# Patient Record
Sex: FEMALE | Race: WHITE | URBAN - METROPOLITAN AREA
[De-identification: names, ages, dates, MRNs, and addresses within clinical notes are randomized per-mention and may not be internally consistent; named-entity substitution may affect disease eponyms.]

---

## 2017-03-29 ENCOUNTER — EMPLOYEE WELLNESS (OUTPATIENT)
Dept: OTHER | Age: 57
End: 2017-03-29

## 2017-03-29 LAB
CHOLESTEROL, TOTAL: 240 MG/DL (ref 0–199)
GLUCOSE BLD-MCNC: 100 MG/DL (ref 70–99)
HDLC SERPL-MCNC: 59 MG/DL (ref 40–60)
LDL CHOLESTEROL CALCULATED: 163 MG/DL
TRIGL SERPL-MCNC: 90 MG/DL (ref 0–150)

## 2018-03-20 VITALS — WEIGHT: 205 LBS

## 2020-12-01 ENCOUNTER — PROCEDURE VISIT (OUTPATIENT)
Dept: AUDIOLOGY | Age: 60
End: 2020-12-01
Payer: COMMERCIAL

## 2020-12-01 ENCOUNTER — OFFICE VISIT (OUTPATIENT)
Dept: ENT CLINIC | Age: 60
End: 2020-12-01
Payer: COMMERCIAL

## 2020-12-01 VITALS
HEIGHT: 64 IN | DIASTOLIC BLOOD PRESSURE: 73 MMHG | BODY MASS INDEX: 36.54 KG/M2 | SYSTOLIC BLOOD PRESSURE: 109 MMHG | WEIGHT: 214 LBS | HEART RATE: 84 BPM | TEMPERATURE: 97.6 F

## 2020-12-01 PROCEDURE — 92557 COMPREHENSIVE HEARING TEST: CPT | Performed by: AUDIOLOGIST

## 2020-12-01 PROCEDURE — 92567 TYMPANOMETRY: CPT | Performed by: AUDIOLOGIST

## 2020-12-01 PROCEDURE — 99243 OFF/OP CNSLTJ NEW/EST LOW 30: CPT | Performed by: STUDENT IN AN ORGANIZED HEALTH CARE EDUCATION/TRAINING PROGRAM

## 2020-12-01 NOTE — PROGRESS NOTES
Sukhwinder Madrid   1960, 63 y.o. female   <[id number]>       Referring Provider: Naz Chapman MD  Referral Type: In an order in [address] [place] [address]    Reason for Visit: Evaluation of suspected change in hearing, tinnitus, or balance. ADULT AUDIOLOGIC EVALUATION      Sukhwinder Madrid is a 63 y.o. female seen today, 12/1/2020 , for an initial audiologic evaluation. Patient was seen by Naz Chapman MD following today's evaluation. AUDIOLOGIC AND OTHER PERTINENT MEDICAL HISTORY:      Sukhwinder Madrid noted tinnitus and family history of hearing loss. Patient reports a gradual decrease in hearing AU. This began several years ago but she feels it has progressed in the last 5 years. She has been wearing Resound DOT RICs for 8-9 years. Patient reports she is still having difficulty hearing speech with the hearing aids. She notes intermittent tinnitus bilaterally. Patient also reports her mother and 3 of her sisters have a history of hearing loss. Sukhwinder Madrid denied otalgia, aural fullness, otorrhea, dizziness, imbalance, history of falls, history of significant noise exposure, history of head trauma and history of ear surgery. Date: 12/1/2020     IMPRESSIONS:      AU: Mild to Moderate SNHL, Excellent WRS, Type A tymps    Hearing loss consistent with SNHL. Hearing loss significant enough to create hearing difficulty in most listening situations. Discussed hearing loss, tinnitus, and hearing aids with patient. Patient is welcome to follow up with her dispensing audiologist or to return to [address] [place] [address] for a HAE to discuss new technology. Patient to follow medical recommendations per Naz Chapman MD .    ASSESSMENT AND FINDINGS:     Otoscopy revealed: Clear ear canals bilaterally    RIGHT EAR:  Hearing Sensitivity: Mild to Moderate Sensorineural hearing loss. Speech Recognition Threshold: 55 dB HL  Word Recognition: Excellent (%), based on NU-6 25-word list at 85m dBHL using recorded speech stimuli.     Tympanometry: Normal peak pressure and compliance, Type A tympanogram, consistent with normal middle ear function. Acoustic Reflexes: Ipsilateral: Did not test. Contralateral: Did not test.    LEFT EAR:  Hearing Sensitivity: Mild to Moderate Sensorineural hearing loss. Speech Recognition Threshold: 55 dB HL  Word Recognition: Excellent (%), based on NU-6 25-word list at 85m dBHL using recorded speech stimuli. Tympanometry: Normal peak pressure and compliance, Type A tympanogram, consistent with normal middle ear function. Acoustic Reflexes: Ipsilateral: Did not test. Contralateral: Did not test.    Reliability: Good  Transducer: Inserts    See scanned audiogram dated 12/1/2020  for results. PATIENT EDUCATION:       The following items were discussed with the patient:    - Good Communication Strategies    - Hearing Loss and Hearing Aids   - Tinnitus Management Strategies    Educational information was shared in the After Visit Summary. RECOMMENDATIONS:                                                                                                                                                                                                                                                            The following items are recommended based on patient report and results from today's appointment:   - Continue medical follow-up with Leonila Ventura MD.   - Retest hearing as medically indicated and/or sooner if a change in hearing is noted. - If desired, schedule a Hearing Aid Evaluation (HAE) appointment to discuss hearing aid options    - Continue hearing aid use and follow-up with dispensing audiologist as needed   - Utilize \"Good Communication Strategies\" as discussed to assist in speech understanding with communication partners. - Maintain a sound enriched environment to assist in the management of tinnitus symptoms.          Wilfrid Cox  Audiologist    Chart CC'd to: Leonila Ventura MD      Degree of   Hearing Sensitivity dB Range   Within Normal Limits (WNL) 0 - 20   Mild 20 - 40   Moderate 40 - 55   Moderately-Severe 55 - 70   Severe 70 - 90   Profound 90 +

## 2020-12-01 NOTE — PROGRESS NOTES
[facility]      Patient Name: Marycarmen Cardoso  Medical Record Number:  <[id number]>  Primary Care Physician:  Lorelei Zaidi  Date of Consultation: 12/1/2020    Chief Complaint:   Chief Complaint   Patient presents with    Hearing Problem        HISTORY OF PRESENT ILLNESS  Raquel is a(n) 63 y.o. female who presents today for evaluation of hearing loss. She had an audiogram which I independently reviewed today. Shows evidence of bilateral mild sloping to moderate sensorineural hearing loss which is symmetric. She has type a tympanograms bilaterally. 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 Her job requires her to be able to talk on the phone, listen to multiple telemetry signals and interact with staff in a noisy environment. She currently wears hearing aids. These are approximately 6years old. She has now finding that she has significant difficulties performing her duties at work due to her hearing loss. She often asked people to repeat themselves multiple times. She is having trouble differentiating certain telemetry unit signals. I independently reviewed the patients past medical history, past surgical history, and social history. They are unremarkable except as noted in the HPI and below. The patient denies any family history related to the current complaint, and they deny any family history of bleeding disorders or difficulties with anesthesia unless noted below. There is no problem list on file for this patient. No past surgical history on file. No family history on file.   Social History     Tobacco Use    Smoking status: Not on file   Substance Use Topics    Alcohol use: Not on file    Drug use: Not on file        Orders Only on 03/29/2017   Component Date Value Ref Range Status    Glucose 03/29/2017 100* 70 - 99 mg/dL Final    Cholesterol, Total 03/29/2017 240* 0 - 199 mg/dL Final    Triglycerides 03/29/2017 90  0 - 150 mg/dL Final    HDL 03/29/2017 59  40 - 60 mg/dL Final    LDL Calculated 03/29/2017 163* <100 mg/dL Final        DRUG/FOOD ALLERGIES: Patient has no known allergies. CURRENT MEDICATIONS  Prior to Admission medications    Not on File       REVIEW OF SYSTEMS  The following systems were reviewed and revealed the following in addition to any already discussed in the HPI:    CONSTITUTIONAL: no weight loss, no fever, no night sweats, no chills  EYES: no vision changes, no blurry vision  EARS: no changes in hearing, no otalgia  NOSE: no epistaxis, no rhinorrhea  RESPIRATORY: no  Difficulty breathing, no shortness of breath  CV: no chest pain, no Peripheral vascular disease  HEME: No coagulation disorder, no Bleeding disorder  NEURO: no TIA or stroke-like symptoms  SKIN: No new rashes in the head and neck, no recent skin cancers  MOUTH: No new ulcers, no recent teeth infections  GASTROINTESTINAL: No diarrhea, stomach pain  PSYCH: No anxiety, no depression      PHYSICAL EXAM  /73 (Site: Left Upper Arm, Position: Sitting, Cuff Size: Medium Adult)   Pulse 84   Temp 97.6 °F (36.4 °C) (Temporal)   Ht 5' 4\" (1.626 m)   Wt 214 lb (97.1 kg)   BMI 36.73 kg/m²     GENERAL: No acute distress, alert and oriented, no hoarseness, strong voice  EYES: EOMI, Anti-icteric  HENT:   Head: Normocephalic and atraumatic.    Face:  Symmetric, facial nerve intact, no sinus tenderness  Right Ear: Normal external ear, normal external auditory canal, intact tympanic membrane with normal mobility and aerated middle ear  Left Ear: Normal external ear, normal external auditory canal, intact tympanic membrane with normal mobility and aerated middle ear  Mouth/Oral Cavity:  normal lips, Uvula is midline, no mucosal lesions, no trismus, normal dentition, normal salivary quality/flow  Oropharynx/Larynx:  normal oropharynx, normal tonsils; patient did not tolerate mirror exam due to excessive gag reflex  Nose:Normal external nasal appearance. Anterior rhinoscopy shows a normal septum. Normal turbinates. Normal mucosa   NECK: Normal range of motion, no thyromegaly, trachea is midline, no lymphadenopathy, no neck masses, no crepitus  CHEST: Normal respiratory effort, no retractions, breathing comfortably  SKIN: No rashes, normal appearing skin, no evidence of skin lesions/tumors  Neuro:  cranial nerve II-XII intact; normal gait  Cardio:  no edema    Patient wears bilateral hearing aids. PROCEDURE      ASSESSMENT/PLAN  1. Sensorineural hearing loss (SNHL) of both ears      2. Tinnitus of both ears      This very pleasant 76-year-old female here today for evaluation of issues related to her ears. I went over her audiogram with her and explained to her that her difficulties hearing in her work environment are directly related to her hearing loss. Despite her wearing hearing aids, I suspect that the fact that there 6years old they are a bit outdated as well as they are probably under powered for her hearing loss at this point. I discussed with her that the best plan of action going forward be discussed with her supervisor, managers and human resources regarding what type of accommodations can be made for her she has now finding it difficult to perform her normal functions in her job. Regarding her hearing loss, she certainly is a good candidate for hearing aid upgrade. She will check with her insurance to see if anything is covered, otherwise she will have to wait until she is able to afford them. I provided her with instructions to contact me if I can be of any further assistance going forward. This note was generated completely or in part utilizing Dragon dictation speech recognition software. Occasionally, words are mistranscribed and despite editing, the text may contain inaccuracies due to incorrect word recognition.   If further clarification is needed please contact the office at [phone])

## 2020-12-01 NOTE — PATIENT INSTRUCTIONS
Good Communication Strategies    Communication can be challenging for anyone, but can be especially difficult for those with some degree of hearing loss. While we may not be able to control every factor that may lead to difficulty with communication, there are Good Communication Strategies that we can all use in our day-to-day lives. Communication takes both parties working together for it to be successful. Tips as a Listener:   1. Control your environment. It is important to limit the amount of background noise in the room when possible. You should also consider having a good light source in the room to best see the other person. 2. Ask for clarification. Instead of saying \"What?\", you can use parts of what you heard to make a new question. For example, if you heard the word \"Thursday\" but not the rest of the week, you may ask \"What was that about Thursday? \" or \"What did you want to do Thursday? \". This shows the person talking that you are listening and will help them better explain what they are saying. 3. Be an advocate for yourself. If you are hearing but not understanding, tell the other person \"I can hear you, but I need you to slow down when you speak. \"  Or if someone is facing the other direction, say \"I cannot hear you when you are not looking at me when we talk. \"       Tips as a Talker:   - Sit or stand 3 to 6 feet away to maximize audibility         -- It is unrealistic to believe someone else will fully hear your message if you are speaking from across the room or in a different room in the house   - Stay at eye level to help with visual cues   - Make sure you have the persons attention before speaking   - Use facial expressions and gestures to accentuate your message   - Raise your voice slightly (do not scream)   - Speak slowly and distinctly   - Use short, simple sentences   - Rephrase your words if the person is having a hard time understanding you    - To avoid distortion, dont speak directly into a persons ear      Some additional items that may be helpful:   - Remain patient - this is important for both parties   - Write down items that still cannot be heard/understood. You may write with pen/paper or consider typing/texting on a cell phone or smart device. - If background noise is unavoidable, try to keep yourself in a good position in the room. By sitting at a shea on the side of the restaurant (preferably a corner), it will be easier to communicate than if you were sitting at a table in the middle with background noise surrounding you. Keep yourself positioned away from music speakers or heavy foot traffic. Hearing Loss: Care Instructions  Your Care Instructions      Hearing loss is a sudden or slow decrease in how well you hear. It can range from mild to profound. Permanent hearing loss can occur with aging, and it can happen when you are exposed long-term to loud noise. Examples include listening to loud music, riding motorcycles, or being around other loud machines. Hearing loss can affect your work and home life. It can make you feel lonely or depressed. You may feel that you have lost your independence. But hearing aids and other devices can help you hear better and feel connected to others. Follow-up care is a key part of your treatment and safety. Be sure to make and go to all appointments, and call your doctor if you are having problems. It's also a good idea to know your test results and keep a list of the medicines you take. How can you care for yourself at home? · Avoid loud noises whenever possible. This helps keep your hearing from getting worse. Always wear hearing protection around loud noises. · If appropriate, wear hearing aid(s) as directed. It is recommended that hearing aids are worn during all waking hours to keep your brain active and give it access to the sounds it is missing.       · If you are beginning your process with hearing aid(s), schedule a \"Hearing Aid Evaluation\" with an audiologist to discuss your lifestyle, features of hearing aid technology, and styles of hearing aids available. It is recommended that you contact your insurance company to determine if you have a hearing aid benefit, as this may dictate who you can see for these services. · Have hearing tests as your doctor suggests. They can show whether your hearing has changed. Your hearing aid may need to be adjusted. · Use other assistive devices as needed. These may include:  ? Telephone amplifiers and hearing aids that can connect to a television, stereo, radio, or microphone. ? Devices that use lights or vibrations. These alert you to the doorbell, a ringing telephone, or a baby monitor. ? Television closed-captioning. This shows the words at the bottom of the screen. Most new TVs can do this. ? TTY (text telephone). This lets you type messages back and forth on the telephone instead of talking or listening. These devices are also called TDD. When messages are typed on the keyboard, they are sent over the phone line to a receiving TTY. The message is shown on a monitor. · Use pagers, fax machines, text, and email if it is hard for you to communicate by telephone. · Try to learn a listening technique called speech-reading. It is not lip-reading. You pay attention to people's gestures, expressions, posture, and tone of voice. These clues can help you understand what a person is saying. Face the person you are talking to, and have him or her face you. Make sure the lighting is good. You need to see the other person's face clearly. · Think about counseling if you need help to adjust to your hearing loss. When should you call for help? Watch closely for changes in your health, and be sure to contact your doctor if:    · You think your hearing is getting worse. · You have new symptoms, such as dizziness or nausea.            Tinnitus: Overview and Management Strategies          Many people have some ringing sounds in their ears once in a while. You may hear a roar, a hiss, a tinkle, or a buzz. The sound usually lasts only a few minutes. If it goes on all the time, you may have tinnitus. Tinnitus is usually caused by long-term exposure to loud noise. This damages the nerves in the inner ear. It can occur with all types of hearing loss. It may be a symptom of almost any ear problem. Tinnitus may be caused by a buildup of earwax. Or, it may be caused by ear infections or certain medicines (especially antibiotics or large amounts of aspirin). You can also hear noises in your ears because of an injury to the ears, drinking too much alcohol or caffeine, or a medical condition. Other conditions may also contribute to tinnitus, including: head and neck trauma, temporomandibular joint disorder (TMJ), sinus pressure and barometric trauma, traumatic brain injury, metabolic disorders, autoimmune disorders, stress, and high blood pressure. You may need tests to evaluate your hearing and to find causes of long-lasting tinnitus. Your doctor may suggest one or more treatments to help you cope with the tinnitus. You can also do things at home to help reduce symptoms. Causes of Tinnitus  We do not know the exact cause of tinnitus. One thing we do know is that you are not imagining it. If you have tinnitus, chances are the cause will remain a mystery. Conditions that might cause tinnitus include the following:    Hearing loss    Ménière's disease    Loud noise exposure    Migraines    Head injury    Drugs or medicines that are toxic to hearing    Anemia    High blood pressure    Stress    A lot of wax in the ear    Certain types of tumors    Having a lot of caffeine    Smoking cigarettes  You may find that your tinnitus is worse at night. This happens because it is quiet and you are not distracted.  Feeling tired and stressed may make your tinnitus worse.    Follow-up care is a key part of your treatment and safety. Be sure to make and go to all appointments, and call your doctor if you are having problems. It's also a good idea to know your test results and keep a list of the medicines you take. How can you care for yourself at home? · Limit or cut out alcohol, caffeine, and sodium. They can make your symptoms worse. · Do not smoke or use other tobacco products. Nicotine reduces blood flow to the ear and makes tinnitus worse. If you need help quitting, talk to your doctor about stop-smoking programs and medicines. These can increase your chances of quitting for good. · Talk to your doctor about whether to stop taking aspirin and similar products such as ibuprofen or naproxen. · Get exercise often. It can help improve blood flow to the ear. Hearing Aids and Other Devices  A hearing aid may help your tinnitus if you have a hearing loss. An audiologist can help you find and use the best hearing aid for you. Tinnitus maskers look like hearing aids. They make a sound that masks, or covers up, the tinnitus. The masking sound distracts you from the ringing in your ears. You may be able to use a masker and a hearing aid at the same time. Sound machines can be useful at night or during quiet times. There are machines you can buy at the store. Or, you can find apps on your phone that make sounds, like the ocean or rainfall. Fish tanks, fans, quiet music, and indoor waterfalls can help, as well. Ways to manage/cope with tinnitus  Some tinnitus may last a long time. To manage your tinnitus, try to:  · Avoid noises that you think caused your tinnitus. If you can't avoid loud noises, wear earplugs or earmuffs. · Ignore the sound by paying attention to other things. Keeping your brain busy with other tasks or background noise can help your brain not focus on the tinnitus. · Try to not give the tinnitus an emotional reaction.   Do your best to ignore the sound and not let it bother you. Relax using biofeedback, meditation, or yoga. Feeling stressed and being tired can make tinnitus worse. · Play music or white noise to help you sleep. Background noise may cover up the noise that you hear in your ears. You can buy a tabletop machine or a device that sits under your pillow to play soothing sounds, like ocean waves. · Smart phones have free apps, such as Whist, Relax Melodies, ReSound Relief, and White Noise Lite. These apps have different types of sounds/noise, some of which you can blend together to find sounds that are most soothing to you. · Hearing aid technology, especially when there is some hearing loss, may help reduce tinnitus symptoms by giving your brain better access to the sounds it is missing. There are some hearing aids with built-in noise generator programs, which may help when amplification alone is not enough. Additional resources may be found through the [employer]inn[employer] at www.alex.org    When should you call for help? Call 911 anytime you think you may need emergency care. For example, call if:    · You have symptoms of a stroke. These may include:  ? Sudden numbness, tingling, weakness, or loss of movement in your face, arm, or leg, especially on only one side of your body. ? Sudden vision changes. ? Sudden trouble speaking. ? Sudden confusion or trouble understanding simple statements. ? Sudden problems with walking or balance. ? A sudden, severe headache that is different from past headaches. Call your doctor now or seek immediate medical care if:    · You develop other symptoms. These may include hearing loss (or worse hearing loss), balance problems, dizziness, nausea, or vomiting. Watch closely for changes in your health, and be sure to contact your doctor if:    · Your tinnitus moves from both ears to one ear. · Your hearing loss gets worse within 1 day after an ear injury.      · Your tinnitus or earwax and fluid draining from the ear. Their small size may be hard for some people to handle. They are not made for children. You have some options if you have a hearing problem and are thinking about getting hearing aids. You can go to your doctor or an audiologist. He or she will do a hearing test and help you decide which type and style of hearing aid may be best for you. What else should I know about hearing aids? Find out if your insurance covers hearing aids. They can be expensive. Different types of hearing aids come with different costs. Also find out about a warranty or return policy in case you are not happy with your hearing aids. Follow-up care is a key part of your treatment and safety. Be sure to make and go to all appointments, and call your doctor if you are having problems. It's also a good idea to know your test results and keep a list of the medicines you take. Where can you learn more? Go to https://[employer]. org and sign in to your [employer] account. Enter Q023 in the WOMN box to learn more about \"Learning About Hearing Aids. \"     If you do not have an account, please click on the \"Sign Up Now\" link. Current as of: October 21, 2018  Content Version: 12.1  © 2621-2942 [employer]. Care instructions adapted under license by [facility] ([facility]). If you have questions about a medical condition or this instruction, always ask your healthcare professional. [place] [address] any warranty or liability for your use of this information.

## 2023-02-17 ENCOUNTER — OFFICE VISIT (OUTPATIENT)
Dept: PULMONOLOGY | Age: 63
End: 2023-02-17

## 2023-02-17 VITALS
DIASTOLIC BLOOD PRESSURE: 86 MMHG | HEART RATE: 87 BPM | RESPIRATION RATE: 18 BRPM | SYSTOLIC BLOOD PRESSURE: 126 MMHG | HEIGHT: 66 IN | OXYGEN SATURATION: 97 % | BODY MASS INDEX: 37.12 KG/M2 | WEIGHT: 231 LBS | TEMPERATURE: 97.1 F

## 2023-02-17 DIAGNOSIS — G47.10 HYPERSOMNIA: ICD-10-CM

## 2023-02-17 DIAGNOSIS — Z87.891 PERSONAL HISTORY OF TOBACCO USE: Primary | ICD-10-CM

## 2023-02-17 DIAGNOSIS — J44.9 COPD, MODERATE (HCC): ICD-10-CM

## 2023-02-17 DIAGNOSIS — J30.89 OTHER ALLERGIC RHINITIS: ICD-10-CM

## 2023-02-17 DIAGNOSIS — E66.9 OBESITY (BMI 30-39.9): ICD-10-CM

## 2023-02-17 RX ORDER — FLUTICASONE PROPIONATE 50 MCG
1 SPRAY, SUSPENSION (ML) NASAL DAILY
COMMUNITY

## 2023-02-17 RX ORDER — CITALOPRAM 40 MG/1
40 TABLET ORAL DAILY
COMMUNITY

## 2023-02-17 RX ORDER — ATORVASTATIN CALCIUM 20 MG/1
20 TABLET, FILM COATED ORAL DAILY
COMMUNITY

## 2023-02-17 RX ORDER — ASPIRIN 81 MG/1
81 TABLET ORAL DAILY
COMMUNITY

## 2023-02-17 RX ORDER — CETIRIZINE HYDROCHLORIDE 10 MG/1
10 TABLET ORAL DAILY
COMMUNITY

## 2023-02-17 RX ORDER — VALSARTAN 40 MG/1
40 TABLET ORAL DAILY
COMMUNITY

## 2023-02-17 RX ORDER — BUPROPION HYDROCHLORIDE 150 MG/1
150 TABLET ORAL EVERY MORNING
COMMUNITY

## 2023-02-17 RX ORDER — FLUTICASONE PROPIONATE AND SALMETEROL 100; 50 UG/1; UG/1
1 POWDER RESPIRATORY (INHALATION) EVERY 12 HOURS
COMMUNITY

## 2023-02-17 ASSESSMENT — SLEEP AND FATIGUE QUESTIONNAIRES
HOW LIKELY ARE YOU TO NOD OFF OR FALL ASLEEP WHEN YOU ARE A PASSENGER IN A CAR FOR AN HOUR WITHOUT A BREAK: 2
HOW LIKELY ARE YOU TO NOD OFF OR FALL ASLEEP WHILE SITTING AND READING: 2
ESS TOTAL SCORE: 13
HOW LIKELY ARE YOU TO NOD OFF OR FALL ASLEEP IN A CAR, WHILE STOPPED FOR A FEW MINUTES IN TRAFFIC: 0
HOW LIKELY ARE YOU TO NOD OFF OR FALL ASLEEP WHILE SITTING AND TALKING TO SOMEONE: 0
HOW LIKELY ARE YOU TO NOD OFF OR FALL ASLEEP WHILE WATCHING TV: 1
HOW LIKELY ARE YOU TO NOD OFF OR FALL ASLEEP WHILE LYING DOWN TO REST IN THE AFTERNOON WHEN CIRCUMSTANCES PERMIT: 3
HOW LIKELY ARE YOU TO NOD OFF OR FALL ASLEEP WHILE SITTING QUIETLY AFTER LUNCH WITHOUT ALCOHOL: 2
NECK CIRCUMFERENCE (INCHES): 20
HOW LIKELY ARE YOU TO NOD OFF OR FALL ASLEEP WHILE SITTING INACTIVE IN A PUBLIC PLACE: 3

## 2023-02-17 ASSESSMENT — COPD QUESTIONNAIRES
QUESTION2_CHESTPHLEGM: 0
QUESTION5_HOMEACTIVITIES: 3
QUESTION1_COUGHFREQUENCY: 0
QUESTION8_ENERGYLEVEL: 5
QUESTION6_LEAVINGHOUSE: 0
QUESTION7_SLEEPQUALITY: 3
CAT_TOTALSCORE: 16
QUESTION4_WALKINCLINE: 5
QUESTION3_CHESTTIGHTNESS: 0

## 2023-02-17 NOTE — ASSESSMENT & PLAN NOTE
Trelegy working well but complains of cost.    Generics are cheaper but only two generic medications at this point, Symbicort and Advair. This was discussed.       Will try coupon for Trelegy and monitor cost.

## 2023-02-17 NOTE — PROGRESS NOTES
REASON FOR CONSULTATION/CC: COPD   Chief Complaint   Patient presents with    [facility]    COPD        Consult at request of   [place] [id number], [address] for COPD    PCP: Rosalia CLINE    HISTORY OF PRESENT ILLNESS: Ely Paula is a 60y.o. year old female with a history of      She state she had COPD > 7 ago with PFT    COPD  Was changed for Advair to Trelegy . Has albuterol HFA but not clear it is working   Trelegy is working well. complains of cost of ~ $40 per month. allergic rhinitis  Zyrtec. Obesity       Tobacco   Quit smoking 6 months ago    Wellbutrin         Desaturation at night per iwatch  Snoring   hypersomnia           REVIEW OF SYSTEMS:  Constitutional: Negative for fever    HENT: Negative for sore throat  Eyes: Negative for redness   Respiratory: Negative for dyspnea, cough  Cardiovascular: Negative for chest pain  Gastrointestinal: Negative for vomiting, diarrhea   Genitourinary: Negative for hematuria   Musculoskeletal: Negative for arthralgias   Skin: Negative for rash  Neurological: Negative for syncope  Hematological: Negative for adenopathy  Psychiatric/Behavorial: Negative for anxiety      SOCIAL HISTORY:   reports that she quit smoking about 5 months ago. Her smoking use included cigarettes. She started smoking about 51 years ago. She smoked an average of .25 packs per day. She has been exposed to tobacco smoke. She does not have any smokeless tobacco history on file. PAST MEDICAL HISTORY:  No past medical history on file. PAST SURGICAL HISTORY:  No past surgical history on file. FAMILY HISTORY:  family history includes Cancer in her mother; Diabetes in her mother. Objective:   PHYSICAL EXAM:  Blood pressure 126/86, pulse 87, temperature 97.1 °F (36.2 °C), resp. rate 18, height 5' 5.5\" (1.664 m), weight 231 lb (104.8 kg), SpO2 97 %.'  Gen: No distress. Eyes: PERRL. No sclera icterus. No conjunctival injection. ENT: No discharge. Pharynx clear. External appearance of ears and nose normal. Mallampati  4  Neck: Trachea midline. No obvious mass. Resp: No accessory muscle use. No crackles. No wheezes. No rhonchi. CV: Regular rate. Regular rhythm. No murmur or rub. No edema. GI:    Skin: Warm, dry, normal texture and turgor. No nodule on exposed extremities. Lymph: No cervical LAD. No supraclavicular LAD. M/S: No cyanosis. No clubbing. No joint deformity. Neuro: Moves all four extremities. Psych: Oriented x 3. No anxiety. Awake. Alert. Intact judgement and insight. Current Outpatient Medications   Medication Sig Dispense Refill    valsartan (DIOVAN) 40 MG tablet Take 40 mg by mouth daily      citalopram (CELEXA) 40 MG tablet Take 40 mg by mouth daily      aspirin 81 MG EC tablet Take 81 mg by mouth daily      buPROPion (WELLBUTRIN XL) 150 MG extended release tablet Take 150 mg by mouth every morning      atorvastatin (LIPITOR) 20 MG tablet Take 20 mg by mouth daily      fluticasone (FLONASE) 50 MCG/ACT nasal spray 1 spray by Each Nostril route daily      fluticasone-salmeterol (ADVAIR) 100-50 MCG/ACT AEPB diskus inhaler Inhale 1 puff into the lungs every 12 hours      cetirizine (ZYRTEC) 10 MG tablet Take 10 mg by mouth daily       No current facility-administered medications for this visit.        Data Reviewed:     Category 1 Data points:      Last CBC  Lab Results   Component Value Date/Time    WBC 5.7 04/01/2011 08:37 AM    RBC 4.77 04/01/2011 08:37 AM    HGB 14.5 04/01/2011 08:37 AM    MCV 91.6 04/01/2011 08:37 AM     04/01/2011 08:37 AM     Last Renal  Lab Results   Component Value Date/Time     04/01/2011 08:37 AM    K 4.6 04/01/2011 08:37 AM     04/01/2011 08:37 AM    CO2 23 04/01/2011 08:37 AM    BUN 12 04/01/2011 08:37 AM    CREATININE 0.6 04/01/2011 08:37 AM    GLUCOSE 100 03/29/2017 07:05 AM    CALCIUM 9.3 04/01/2011 08:37 AM       Last ABG  POC Blood Gas: No results found for: Karan Wilson, POCPO2, POCHCO3, NBEA, PJRA1LIL  No results for input(s): PH, PCO2, PO2, HCO3, BE, O2SAT in the last 72 hours. Assessment:          COPD, moderately severe 2017  Tobacco abuse, quit Aug 2022  Obesity Body mass index is 37.86 kg/m². Hypersomnia, Mallampati  4          Plan:      Problem List Items Addressed This Visit       Personal history of tobacco use - Primary     lung cancer screening ordered. Relevant Orders    VT VISIT TO DISCUSS LUNG CA SCREEN W LDCT (Completed)    CT Lung Screen (Annual)    Other allergic rhinitis     Zyrtec         Relevant Medications    fluticasone (FLONASE) 50 MCG/ACT nasal spray    fluticasone-salmeterol (ADVAIR) 100-50 MCG/ACT AEPB diskus inhaler    cetirizine (ZYRTEC) 10 MG tablet    Obesity (BMI 30-39. 9)      Discussed need for weight loss and discussed impact on COPD and ROSI. The patient expressed understanding for all. Advised monitor calories and exercise. Hypersomnia      Likely has sleep apnea. HST ordered. Relevant Orders    Home Sleep Study    COPD, moderate ([address]     Trelegy working well but complains of cost.    Generics are cheaper but only two generic medications at this point, Symbicort and Advair. This was discussed. Will try coupon for Trelegy and monitor cost.           Relevant Medications    fluticasone (FLONASE) 50 MCG/ACT nasal spray    fluticasone-salmeterol (ADVAIR) 100-50 MCG/ACT AEPB diskus inhaler    cetirizine (ZYRTEC) 10 MG tablet             This note was transcribed using [id number] [facility]. Please disregard any translational errors. [address] [facility]  [phone]   Discussed with the patient the current USPSTF guidelines released March 9, 2021 for screening for lung cancer.     For adults aged 48 to [de-identified] years who have a 20 pack-year smoking history and currently smoke or have quit within the past 15 years the grade B recommendation is to:  Screen for lung cancer with low-dose computed tomography (LDCT) every year. Stop screening once a person has not smoked for 15 years or has a health problem that limits life expectancy or the ability to have lung surgery. The patient  reports that she quit smoking about 5 months ago. Her smoking use included cigarettes. She started smoking about 51 years ago. She smoked an average of .25 packs per day. She has been exposed to tobacco smoke. She does not have any smokeless tobacco history on file. . Discussed with patient the risks and benefits of screening, including over-diagnosis, false positive rate, and total radiation exposure. The patient currently exhibits no signs or symptoms suggestive of lung cancer. Discussed with patient the importance of compliance with yearly annual lung cancer screenings and willingness to undergo diagnosis and treatment if screening scan is positive. In addition, the patient was counseled regarding the importance of remaining smoke free and/or total smoking cessation.     Also reviewed the following if the patient has Medicare that as of February 10, 2022, Medicare only covers LDCT screening in patients aged 51-72 with at least a 20 pack-year smoking history who currently smoke or have quit in the last 15 years

## 2023-02-17 NOTE — ASSESSMENT & PLAN NOTE
Discussed need for weight loss and discussed impact on COPD and ROSI. The patient expressed understanding for all. Advised monitor calories and exercise.

## 2023-02-17 NOTE — PATIENT INSTRUCTIONS
Get pFT from [address] [place] [address] Dr. CORDOBA's Feb 2017    LifeSum  Weight daily   Track every calorie  Consider NOOM  Exercise by walking       For    [email or web address]  how to use respimat inhaler   After tries Stiolto, then try Anoro          Learning About Lung Cancer Screening  What is screening for lung cancer? Lung cancer screening is a way to find some lung cancers early, before a person has any symptoms of the cancer. Lung cancer screening may help those who have the highest risk for lung cancer--people age 48 and older who are or were heavy smokers. For most people, who aren't at increased risk, screening for lung cancer probably isn't helpful. Screening won't prevent cancer. And it may not find all lung cancers. Lung cancer screening may lower the risk of dying from lung cancer in a small number of people. How is it done? Lung cancer screening is done with a low-dose CT (computed tomography) scan. A CT scan uses X-rays, or radiation, to make detailed pictures of your body. Experts recommend that screening be done in medical centers that focus on finding and treating lung cancer. Who is screening recommended for? Lung cancer screening is recommended for people age 48 and older who are or were heavy smokers. That means people with a smoking history of at least 20 pack years. A pack year is a way to measure how heavy a smoker you are or were. To figure out your pack years, multiply how many packs a day on average (assuming 20 cigarettes per pack) you have smoked by how many years you have smoked. For example: If you smoked 1 pack a day for 20 years, that's 1 times 20. So you have a smoking history of 20 pack years. If you smoked 2 packs a day for 10 years, that's 2 times 10. So you have a smoking history of 20 pack years. Experts agree that screening is for people who have a high risk of lung cancer. But experts don't agree on what high risk means.  Some say people age 48 or older with at least a 20-pack-year smoking history are high risk. Others say it's people age 54 or older with a 30-pack-year history. To see if you could benefit from screening, first find out if you are at high risk for lung cancer. Your doctor can help you decide your lung cancer risk. What are the risks of screening? CT screening for lung cancer isn't perfect. It can show an abnormal result when it turns out there wasn't any cancer. This is called a false-positive result. This means you may need more tests to make sure you don't have cancer. These tests can be harmful and cause a lot of worry. These tests may include more CT scans and invasive testing like a lung biopsy. In a biopsy, the doctor takes a sample of tissue from inside your lung so it can be looked at under a microscope. A biopsy is the only way to tell if you have lung cancer. If the biopsy finds cancer, you and your doctor will have to decide how or whether to treat it. Some lung cancers found on CT scans are harmless and would not have caused a problem if they had not been found through screening. But because doctors can't tell which ones will turn out to be harmless, most will be treated. This means that you may get treatment--including surgery, radiation, or chemotherapy--that you don't need. There is a risk of damage to cells or tissue from being exposed to radiation, including the small amounts used in CTs, X-rays, and other medical tests. Over time, exposure to radiation may cause cancer and other health problems. But in most cases, the risk of getting cancer from being exposed to small amounts of radiation is low. It's not a reason to avoid these tests for most people. What are the benefits of screening? Your scan may be normal (negative). For some people who are at higher risk, screening lowers the chance of dying of lung cancer. How much and how long you smoked helps to determine your risk level.  Screening can find some cancers early, when treatment may be more likely to work.  What happens after screening?  The results of your CT scan will be sent to your doctor. Someone from your care team will explain the results of your scan and answer any questions you may have. If you need any follow-up, he or she will help you understand what to do next.  After a lung cancer screening, you can go back to your usual activities right away.  A lung cancer screening test can't tell if you have lung cancer. If your results are positive, your doctor can't tell whether an abnormal finding is a harmless nodule, cancer, or something else without doing more tests.  What can you do to help prevent lung cancer?  Some lung cancers can't be prevented. But if you smoke, quitting smoking is the best step you can take to prevent lung cancer. If you want to quit, your doctor can recommend medicines or other ways to help.  Follow-up care is a key part of your treatment and safety. Be sure to make and go to all appointments, and call your doctor if you are having problems. It's also a good idea to know your test results and keep a list of the medicines you take.  Where can you learn more?  Go to https://www.Thanx.net/patientEd and enter Q940 to learn more about \"Learning About Lung Cancer Screening.\"  Current as of: May 4, 2022               Content Version: 13.5  © 5518-7588 [employer].   Care instructions adapted under license by [employer]. If you have questions about a medical condition or this instruction, always ask your healthcare professional. [employer] disclaims any warranty or liability for your use of this information.

## 2023-03-02 ENCOUNTER — HOSPITAL ENCOUNTER (OUTPATIENT)
Dept: SLEEP CENTER | Age: 63
Discharge: HOME | End: 2023-03-02
Payer: COMMERCIAL

## 2023-03-02 DIAGNOSIS — G47.10 HYPERSOMNIA: ICD-10-CM

## 2023-03-02 PROCEDURE — 95806 SLEEP STUDY UNATT&RESP EFFT: CPT

## 2023-03-06 PROBLEM — G47.33 OSA (OBSTRUCTIVE SLEEP APNEA): Status: ACTIVE | Noted: 2023-03-06

## 2023-03-06 PROBLEM — R09.02 HYPOXEMIA: Status: ACTIVE | Noted: 2023-03-06

## 2023-03-08 ENCOUNTER — TELEPHONE (OUTPATIENT)
Dept: PULMONOLOGY | Age: 63
End: 2023-03-08

## 2023-03-08 NOTE — TELEPHONE ENCOUNTER
Home Sleep study showed moderate ROSI. AHI was 23.4  per hr. And O2 Desaturations to 76%. Pt notified of sleep results. Pt wants to use [facility] [email or web address]  for her DME. Pt aware to call office 31-90 days for a follow up with . Order faxed to [facility] [employer].

## 2023-03-18 ENCOUNTER — HOSPITAL ENCOUNTER (OUTPATIENT)
Dept: CT IMAGING | Age: 63
Discharge: HOME | End: 2023-03-18
Payer: COMMERCIAL

## 2023-03-18 DIAGNOSIS — Z87.891 PERSONAL HISTORY OF TOBACCO USE: ICD-10-CM

## 2023-03-18 PROCEDURE — 71271 CT THORAX LUNG CANCER SCR C-: CPT

## 2023-06-01 ENCOUNTER — OFFICE VISIT (OUTPATIENT)
Dept: PULMONOLOGY | Age: 63
End: 2023-06-01
Payer: COMMERCIAL

## 2023-06-01 VITALS
BODY MASS INDEX: 36.71 KG/M2 | WEIGHT: 228.4 LBS | TEMPERATURE: 97.5 F | DIASTOLIC BLOOD PRESSURE: 80 MMHG | RESPIRATION RATE: 18 BRPM | OXYGEN SATURATION: 94 % | SYSTOLIC BLOOD PRESSURE: 130 MMHG | HEIGHT: 66 IN | HEART RATE: 74 BPM

## 2023-06-01 DIAGNOSIS — J44.9 COPD, MODERATE (HCC): ICD-10-CM

## 2023-06-01 DIAGNOSIS — J30.89 OTHER ALLERGIC RHINITIS: ICD-10-CM

## 2023-06-01 DIAGNOSIS — G47.33 OSA (OBSTRUCTIVE SLEEP APNEA): ICD-10-CM

## 2023-06-01 PROCEDURE — 99214 OFFICE O/P EST MOD 30 MIN: CPT | Performed by: INTERNAL MEDICINE

## 2023-06-01 ASSESSMENT — SLEEP AND FATIGUE QUESTIONNAIRES
HOW LIKELY ARE YOU TO NOD OFF OR FALL ASLEEP WHILE SITTING INACTIVE IN A PUBLIC PLACE: 0
HOW LIKELY ARE YOU TO NOD OFF OR FALL ASLEEP WHEN YOU ARE A PASSENGER IN A CAR FOR AN HOUR WITHOUT A BREAK: 0
ESS TOTAL SCORE: 2
HOW LIKELY ARE YOU TO NOD OFF OR FALL ASLEEP IN A CAR, WHILE STOPPED FOR A FEW MINUTES IN TRAFFIC: 0
HOW LIKELY ARE YOU TO NOD OFF OR FALL ASLEEP WHILE WATCHING TV: 1
HOW LIKELY ARE YOU TO NOD OFF OR FALL ASLEEP WHILE SITTING AND TALKING TO SOMEONE: 0
HOW LIKELY ARE YOU TO NOD OFF OR FALL ASLEEP WHILE LYING DOWN TO REST IN THE AFTERNOON WHEN CIRCUMSTANCES PERMIT: 1
HOW LIKELY ARE YOU TO NOD OFF OR FALL ASLEEP WHILE SITTING QUIETLY AFTER LUNCH WITHOUT ALCOHOL: 0
HOW LIKELY ARE YOU TO NOD OFF OR FALL ASLEEP WHILE SITTING AND READING: 0

## 2023-06-01 NOTE — ASSESSMENT & PLAN NOTE
Titration is not required during this visit. PAP download information:  Usage > 4 hours  82 %   Pressure setting  10. cwp    AHI with usage  2.9     See media tab for full download data. Jonathan Díaz  is deriving benefit from PAP demonstrated by improved [place], AHI, symptoms.

## 2023-06-01 NOTE — PROGRESS NOTES
REASON FOR CONSULTATION/CC: COPD   Chief Complaint   Patient presents with    Follow-up        Consult at request of   Corinne Coker MD for COPD    PCP: Corinne Coker MD    HISTORY OF PRESENT ILLNESS: Dino Kathleen is a 60y.o. year old female with a history of           COPD      albuterol HFA prior to activity. Trelegy is working well. allergic rhinitis  Zyrtec. Improved controlled. Obesity   Uncontrolled. Tobacco           ROSI\  hypersomnia  imrpoved. No naps. No sneezing         Objective:   PHYSICAL EXAM:  Blood pressure 130/80, pulse 74, temperature 97.5 °F (36.4 °C), temperature source Infrared, resp. rate 18, height 5' 5.5\" (1.664 m), weight 228 lb 6.4 oz (103.6 kg), SpO2 94 %.'  Gen: No distress. Eyes: PERRL. No sclera icterus. No conjunctival injection. ENT: No discharge. Pharynx clear. External appearance of ears and nose normal. Mallampati  4  Neck: Trachea midline. No obvious mass. Resp: No accessory muscle use. No crackles. No wheezes. No rhonchi. CV: Regular rate. Regular rhythm. No murmur or rub. No edema. GI:    Skin: Warm, dry, normal texture and turgor. No nodule on exposed extremities. Lymph: No cervical LAD. No supraclavicular LAD. M/S: No cyanosis. No clubbing. No joint deformity. Neuro: Moves all four extremities. Psych: Oriented x 3. No anxiety. Awake. Alert. Intact judgement and insight.     Current Outpatient Medications   Medication Sig Dispense Refill    valsartan (DIOVAN) 40 MG tablet Take 1 tablet by mouth daily      citalopram (CELEXA) 40 MG tablet Take 1 tablet by mouth daily      aspirin 81 MG EC tablet Take 1 tablet by mouth daily      buPROPion (WELLBUTRIN XL) 150 MG extended release tablet Take 1 tablet by mouth every morning      atorvastatin (LIPITOR) 20 MG tablet Take 1 tablet by mouth daily      fluticasone (FLONASE) 50 MCG/ACT nasal spray 1 spray by Each Nostril route daily      fluticasone-salmeterol

## 2023-12-07 ENCOUNTER — OFFICE VISIT (OUTPATIENT)
Dept: PULMONOLOGY | Age: 63
End: 2023-12-07
Payer: COMMERCIAL

## 2023-12-07 VITALS
SYSTOLIC BLOOD PRESSURE: 124 MMHG | WEIGHT: 235.6 LBS | HEIGHT: 64 IN | TEMPERATURE: 97.5 F | RESPIRATION RATE: 18 BRPM | HEART RATE: 83 BPM | BODY MASS INDEX: 40.22 KG/M2 | DIASTOLIC BLOOD PRESSURE: 82 MMHG | OXYGEN SATURATION: 95 %

## 2023-12-07 DIAGNOSIS — J44.9 COPD, MODERATE (HCC): ICD-10-CM

## 2023-12-07 DIAGNOSIS — G47.33 OSA (OBSTRUCTIVE SLEEP APNEA): Primary | ICD-10-CM

## 2023-12-07 DIAGNOSIS — E66.01 OBESITY, MORBID, BMI 40.0-49.9 (HCC): ICD-10-CM

## 2023-12-07 PROCEDURE — 99214 OFFICE O/P EST MOD 30 MIN: CPT | Performed by: INTERNAL MEDICINE

## 2023-12-07 ASSESSMENT — COPD QUESTIONNAIRES
CAT_TOTALSCORE: 13
QUESTION4_WALKINCLINE: 5
QUESTION5_HOMEACTIVITIES: 1
QUESTION7_SLEEPQUALITY: 3
QUESTION3_CHESTTIGHTNESS: 0
QUESTION2_CHESTPHLEGM: 0
QUESTION1_COUGHFREQUENCY: 1
QUESTION6_LEAVINGHOUSE: 0
QUESTION8_ENERGYLEVEL: 3

## 2023-12-07 NOTE — ASSESSMENT & PLAN NOTE
Titration is not required during this visit. PAP download information:  Usage > 4 hours  20 %   Pressure setting  na cwp    AHI with usage        See media tab for full download data. Herlinda Caldera  is deriving benefit from PAP demonstrated by improved [place], AHI, symptoms. Patient has significant insomnia with using his CPAP. She does not have this problem without a period while she derives benefit with improved hypersomnia when she is able to use it, her usage has been very inconsistent. No identifiable issues with mask or settings. We then discussed other options including inspire.   Referral made

## 2023-12-12 ENCOUNTER — TELEPHONE (OUTPATIENT)
Dept: PULMONOLOGY | Age: 63
End: 2023-12-12

## 2023-12-12 NOTE — TELEPHONE ENCOUNTER
Consult from Dr. Tammie Lazo  For inspire  BMI 40            Talked with the patient over the phone went over what the inspire was however she stated that her issue is mostly insomnia and now she has been treated with Benadryl and melatonin and seems to be helping  Patient will follow-up with Dr. Tammie Lazo in regards to her obstructive sleep apnea  I will close off the referral as the patient wants to continue without coming for inspire at this time chest pain/palpitations

## 2024-02-06 ENCOUNTER — TELEPHONE (OUTPATIENT)
Dept: PULMONOLOGY | Age: 64
End: 2024-02-06

## 2024-02-06 NOTE — TELEPHONE ENCOUNTER
Kenna said she spoke with Jose about Inspire, talked with the doctor's office that was going to do this procedure and got scared. She decided that she was going to work harder with using her PAP machine and asks that Dr Garcia review her PAP usage for the last 2 weeks. She would like to talk to him about the results and what she can do going forward. Please advise. Thank you!

## 2024-02-07 NOTE — TELEPHONE ENCOUNTER
Called and discussed result.     Discussion > 10 minutes.       I corrected her multiple times.  She stated multiple times that I wanted her tohave the inspire.  This was corrected. I stated the only thing I want is for her to lose weight and be controlled with ROSI.  How she gets control does not matter.  Control through weight loss would be the best.  She is controlled with cpap but is not compliant.  She was referred for inspire secondary to pt choosing the option.        At the end, she will focus on weight loss and keep trying to use cpap       Future Appointments   Date Time Provider Department Center   6/10/2024  1:20 PM [facility]

## 2024-02-27 ENCOUNTER — TELEPHONE (OUTPATIENT)
Dept: CASE MANAGEMENT | Age: 64
End: 2024-02-27

## 2024-03-15 ENCOUNTER — TELEPHONE (OUTPATIENT)
Dept: PULMONOLOGY | Age: 64
End: 2024-03-15

## 2024-03-15 DIAGNOSIS — Z87.891 PERSONAL HISTORY OF TOBACCO USE: Primary | ICD-10-CM

## 2024-03-15 NOTE — TELEPHONE ENCOUNTER
Pt states she received a call from [facility]  They need an order for the low dose CT  Please advise

## 2024-03-15 NOTE — TELEPHONE ENCOUNTER
Discussed with the patient the current USPSTF guidelines released March 9, 2021 for screening for lung cancer.    For adults aged 50 to 80 years who have a 20 pack-year smoking history and currently smoke or have quit within the past 15 years the grade B recommendation is to:  Screen for lung cancer with low-dose computed tomography (LDCT) every year.  Stop screening once a person has not smoked for 15 years or has a health problem that limits life expectancy or the ability to have lung surgery.    The patient  reports that she quit smoking about 18 months ago. Her smoking use included cigarettes. She started smoking about 52 years ago. She has a 50.7 pack-year smoking history. She has been exposed to tobacco smoke. She does not have any smokeless tobacco history on file.. Discussed with patient the risks and benefits of screening, including over-diagnosis, false positive rate, and total radiation exposure.  The patient currently exhibits no signs or symptoms suggestive of lung cancer.  Discussed with patient the importance of compliance with yearly annual lung cancer screenings and willingness to undergo diagnosis and treatment if screening scan is positive.  In addition, the patient was counseled regarding the importance of remaining smoke free and/or total smoking cessation.    Also reviewed the following if the patient has Medicare that as of February 10, 2022, Medicare only covers LDCT screening in patients aged 50-77 with at least a 20 pack-year smoking history who currently smoke or have quit in the last 15 years

## 2024-04-01 ENCOUNTER — HOSPITAL ENCOUNTER (OUTPATIENT)
Dept: CT IMAGING | Age: 64
Discharge: HOME | End: 2024-04-01
Attending: INTERNAL MEDICINE
Payer: COMMERCIAL

## 2024-04-01 DIAGNOSIS — Z87.891 PERSONAL HISTORY OF TOBACCO USE: ICD-10-CM

## 2024-04-01 PROCEDURE — 71271 CT THORAX LUNG CANCER SCR C-: CPT

## 2024-06-10 ENCOUNTER — OFFICE VISIT (OUTPATIENT)
Dept: PULMONOLOGY | Age: 64
End: 2024-06-10
Payer: COMMERCIAL

## 2024-06-10 VITALS
HEART RATE: 83 BPM | SYSTOLIC BLOOD PRESSURE: 118 MMHG | DIASTOLIC BLOOD PRESSURE: 80 MMHG | HEIGHT: 64 IN | RESPIRATION RATE: 18 BRPM | WEIGHT: 199.5 LBS | OXYGEN SATURATION: 96 % | BODY MASS INDEX: 34.06 KG/M2 | TEMPERATURE: 97.6 F

## 2024-06-10 DIAGNOSIS — G47.33 OSA (OBSTRUCTIVE SLEEP APNEA): ICD-10-CM

## 2024-06-10 DIAGNOSIS — J44.9 COPD, MODERATE (HCC): Primary | ICD-10-CM

## 2024-06-10 DIAGNOSIS — E66.9 OBESITY (BMI 30-39.9): ICD-10-CM

## 2024-06-10 PROCEDURE — 99214 OFFICE O/P EST MOD 30 MIN: CPT | Performed by: INTERNAL MEDICINE

## 2024-06-10 RX ORDER — CLONAZEPAM 1 MG/1
1 TABLET ORAL 2 TIMES DAILY PRN
COMMUNITY

## 2024-06-10 ASSESSMENT — COPD QUESTIONNAIRES
QUESTION6_LEAVINGHOUSE: 0
QUESTION1_COUGHFREQUENCY: 1
QUESTION4_WALKINCLINE: 5
QUESTION3_CHESTTIGHTNESS: 0
QUESTION8_ENERGYLEVEL: 2
QUESTION2_CHESTPHLEGM: 0
QUESTION7_SLEEPQUALITY: 3
CAT_TOTALSCORE: 11
QUESTION5_HOMEACTIVITIES: 0

## 2024-06-10 ASSESSMENT — SLEEP AND FATIGUE QUESTIONNAIRES
HOW LIKELY ARE YOU TO NOD OFF OR FALL ASLEEP WHILE SITTING AND READING: WOULD NEVER DOZE
HOW LIKELY ARE YOU TO NOD OFF OR FALL ASLEEP WHEN YOU ARE A PASSENGER IN A CAR FOR AN HOUR WITHOUT A BREAK: WOULD NEVER DOZE
HOW LIKELY ARE YOU TO NOD OFF OR FALL ASLEEP WHILE SITTING AND TALKING TO SOMEONE: WOULD NEVER DOZE
HOW LIKELY ARE YOU TO NOD OFF OR FALL ASLEEP WHILE SITTING QUIETLY AFTER LUNCH WITHOUT ALCOHOL: WOULD NEVER DOZE
ESS TOTAL SCORE: 3
HOW LIKELY ARE YOU TO NOD OFF OR FALL ASLEEP WHILE SITTING INACTIVE IN A PUBLIC PLACE: WOULD NEVER DOZE
HOW LIKELY ARE YOU TO NOD OFF OR FALL ASLEEP WHILE LYING DOWN TO REST IN THE AFTERNOON WHEN CIRCUMSTANCES PERMIT: MODERATE CHANCE OF DOZING
HOW LIKELY ARE YOU TO NOD OFF OR FALL ASLEEP IN A CAR, WHILE STOPPED FOR A FEW MINUTES IN TRAFFIC: WOULD NEVER DOZE
HOW LIKELY ARE YOU TO NOD OFF OR FALL ASLEEP WHILE WATCHING TV: SLIGHT CHANCE OF DOZING

## 2024-06-10 NOTE — PROGRESS NOTES
REASON FOR CONSULTATION/CC: COPD   Chief Complaint   Patient presents with    COPD    Sleep Apnea        Consult at request of   Peggy Mercado MD for COPD    PCP: Peggy Mercado MD    HISTORY OF PRESENT ILLNESS: Kenna Carrillo is a 63 y.o. year old female with a history of               ROSI with obesity  No using device   Down to #199.  Sleeping better.    Breathing better       COPD  See above.   Able to do more with weight loss.   She forget Trelegy but notices the difference after skipping for 2 days.        allergic rhinitis               Objective:   PHYSICAL EXAM:  Blood pressure 118/80, pulse 83, temperature 97.6 °F (36.4 °C), resp. rate 18, height 1.626 m (5' 4\"), weight 90.5 kg (199 lb 8 oz), SpO2 96 %.'  Gen: No distress.    Eyes: PERRL. No sclera icterus. No conjunctival injection.   ENT: No discharge. Pharynx clear. External appearance of ears and nose normal. Mallampati  4  Neck: Trachea midline. No obvious mass.    Resp: No accessory muscle use. No crackles. No wheezes. No rhonchi.    CV: Regular rate. Regular rhythm. No murmur or rub. No edema.   GI:    Skin: Warm, dry, normal texture and turgor. No nodule on exposed extremities.   Lymph: No cervical LAD. No supraclavicular LAD.   M/S: No cyanosis. No clubbing. No joint deformity.    Neuro: Moves all four extremities.    Psych: Oriented x 3. No anxiety.  Awake. Alert. Intact judgement and insight.    Current Outpatient Medications   Medication Sig Dispense Refill    clonazePAM (KLONOPIN) 1 MG tablet Take 1 tablet by mouth 2 times daily as needed. Max Daily Amount: 2 mg      valsartan (DIOVAN) 40 MG tablet Take 1 tablet by mouth daily      citalopram (CELEXA) 40 MG tablet Take 1 tablet by mouth daily      aspirin 81 MG EC tablet Take 1 tablet by mouth daily      buPROPion (WELLBUTRIN XL) 150 MG extended release tablet Take 1 tablet by mouth every morning      atorvastatin (LIPITOR) 20 MG tablet Take 1 tablet by mouth daily

## 2024-06-11 NOTE — ASSESSMENT & PLAN NOTE
Sleep is significantly better after weight loss.  He did not pursue inspire.  She was congratulated on her weight loss

## 2024-06-11 NOTE — ASSESSMENT & PLAN NOTE
Patient has been working diligently with weight loss and his pain of dividends.  This is improving her shortness of breath and exercise tolerance.  With his improvement, she has used Trelegy less than daily.  She is controlled with this.  She does notice a difference if she skips for 3 days in a row.

## 2024-06-25 ENCOUNTER — TELEPHONE (OUTPATIENT)
Dept: PULMONOLOGY | Age: 64
End: 2024-06-25

## 2024-07-02 ENCOUNTER — TELEPHONE (OUTPATIENT)
Dept: PULMONOLOGY | Age: 64
End: 2024-07-02

## 2024-07-02 NOTE — PROGRESS NOTES
Kenna Carrillo         : 1960  [] [facility]     [] [facility]      [] [facility]     [][facility]    [] [facility]  [] [facility]   [] Other:  Diagnosis: [x] ROSI (G47.33) [] CSA (G47.31) [] Apnea (G47.30)   Length of Need: [] 12 Months [x] 99 Months [] Other:    Machine (JOSE JUAN!): [] Respironics Dream Station      Auto [] ResMed AirSense     Auto [x] Other: Travel CPAP     [x]  CPAP () [] Bilevel ()   Mode: [x] Auto [] Spontaneous    Mode: [] Auto [] Spontaneous          5-20 CWP  EPAP Min:   IPAP Max:   PS     Comfort Settings:   - Ramp Pressure: 5 cmH2O                                        - Ramp time: 15 min                                     -  Flex/EPR - 3 full time                                    - For ResMed Bilevel (TiMax-4 sec   TiMin- 0.2 sec)     Humidifier: [x] Heated ()        [x] Water chamber replacement ()/ 1 per 6 months        Mask:  Mask of Patient's Choice   [] Nasal () /1 per 3 months [] Full Face () /1 per 3 months   [] Patient choice -Size and fit mask [] Patient Choice - Size and fit mask   [] Dispense:  [] Dispense:    [] Headgear () / 1 per 3 months [] Headgear () / 1 per 3 months   [] Replacement Nasal Cushion ()/2 per month [] Interface Replacement ()/1 per month   [] Replacement Nasal Pillows ()/2 per month         Tubing: [x] Heated ()/1 per 3 months    [] Standard ()/1 per 3 months [] Other:           Filters: [x] Non-disposable ()/1 per 6 months     [x] Ultra-Fine, Disposable ()/2 per month        Miscellaneous: [] Chin Strap ()/ 1 per 6 months [] O2 bleed-in:       LPM   [] Oximetry on CPAP/Bilevel []  Other:          Start Order Date: 24    MEDICAL JUSTIFICATION:  I, the undersigned, certify that the above prescribed supplies are medically necessary for this patient’s wellbeing.  In my opinion, the supplies are both reasonable and necessary in reference to accepted standards of medicalpractice in

## 2024-07-02 NOTE — TELEPHONE ENCOUNTER
Patient calling and needs a prescription for travel Cpap machine so she can fit it in to carryon.   Sent to [facility]. Patient needs this with in two weeks.

## 2024-12-12 ENCOUNTER — TELEPHONE (OUTPATIENT)
Dept: CARDIOLOGY CLINIC | Age: 64
End: 2024-12-12

## 2024-12-12 DIAGNOSIS — I73.9 PAD (PERIPHERAL ARTERY DISEASE) (HCC): Primary | ICD-10-CM

## 2024-12-12 NOTE — TELEPHONE ENCOUNTER
Called and spoke to patient that she needs to be scheduled for bilateral lower extremity arterial Doppler. She is agreeable and I let her know that our office will call to schedule.

## 2025-01-03 ENCOUNTER — HOSPITAL ENCOUNTER (OUTPATIENT)
Dept: VASCULAR LAB | Age: 65
Discharge: HOME | End: 2025-01-03
Attending: INTERNAL MEDICINE
Payer: MEDICARE

## 2025-01-03 DIAGNOSIS — I73.9 PAD (PERIPHERAL ARTERY DISEASE) (HCC): ICD-10-CM

## 2025-01-03 PROCEDURE — 93925 LOWER EXTREMITY STUDY: CPT

## 2025-01-04 LAB
VAS LEFT ABI: 1.04
VAS LEFT ARM BP: 136 MMHG
VAS LEFT ATA MID PSV: 57.6 CM/S
VAS LEFT CFA DIST PSV: 105.1 CM/S
VAS LEFT CFA PROX PSV: 95.7 CM/S
VAS LEFT DORSALIS PEDIS BP: 146 MMHG
VAS LEFT PERONEAL MID PSV: 51.4 CM/S
VAS LEFT PFA PROX PSV: 61 CM/S
VAS LEFT POP A DIST PSV: 60 CM/S
VAS LEFT POP A PROX PSV: 55.1 CM/S
VAS LEFT POP A PROX VEL RATIO: 0.86
VAS LEFT PTA BP: 140 MMHG
VAS LEFT PTA MID PSV: 66.2 CM/S
VAS LEFT SFA DIST PSV: 63.7 CM/S
VAS LEFT SFA DIST VEL RATIO: 0.68
VAS LEFT SFA MID PSV: 93.2 CM/S
VAS LEFT SFA MID VEL RATIO: 0.95
VAS LEFT SFA PROX PSV: 98.1 CM/S
VAS LEFT SFA PROX VEL RATIO: 0.93
VAS RIGHT ABI: 1.04
VAS RIGHT ARM BP: 140 MMHG
VAS RIGHT ATA MID PSV: 70.2 CM/S
VAS RIGHT CFA DIST PSV: 103.4 CM/S
VAS RIGHT CFA PROX PSV: 139.6 CM/S
VAS RIGHT DORSALIS PEDIS BP: 142 MMHG
VAS RIGHT PERONEAL MID PSV: 53.7 CM/S
VAS RIGHT PFA PROX PSV: 87 CM/S
VAS RIGHT POP A DIST PSV: 68 CM/S
VAS RIGHT POP A PROX PSV: 57 CM/S
VAS RIGHT POP A PROX VEL RATIO: 0.9
VAS RIGHT PTA BP: 146 MMHG
VAS RIGHT PTA MID PSV: 73.5 CM/S
VAS RIGHT SFA DIST PSV: 63.6 CM/S
VAS RIGHT SFA DIST VEL RATIO: 0.68
VAS RIGHT SFA MID PSV: 92.9 CM/S
VAS RIGHT SFA MID VEL RATIO: 1
VAS RIGHT SFA PROX PSV: 96.6 CM/S
VAS RIGHT SFA PROX VEL RATIO: 0.7

## 2025-01-04 PROCEDURE — 93925 LOWER EXTREMITY STUDY: CPT | Performed by: SURGERY

## 2025-03-12 ENCOUNTER — TELEPHONE (OUTPATIENT)
Dept: CASE MANAGEMENT | Age: 65
End: 2025-03-12

## 2025-05-10 ENCOUNTER — TELEPHONE (OUTPATIENT)
Dept: CASE MANAGEMENT | Age: 65
End: 2025-05-10

## 2025-06-10 ENCOUNTER — TELEPHONE (OUTPATIENT)
Dept: CASE MANAGEMENT | Age: 65
End: 2025-06-10

## 2025-06-10 NOTE — TELEPHONE ENCOUNTER
Lung Cancer Screening Radiology Recommendation reminder letter mailed to patient, this is patients 3rd & final reminder letter.   Patient has current OV scheduled with ordering provider, Dr. Garcia, on 7/8/2025.   Current documented smoking history reviewed.

## 2025-07-09 ENCOUNTER — TELEPHONE (OUTPATIENT)
Dept: PULMONOLOGY | Age: 65
End: 2025-07-09

## 2025-07-09 DIAGNOSIS — Z87.891 PERSONAL HISTORY OF TOBACCO USE: Primary | ICD-10-CM

## 2025-07-09 PROCEDURE — G0296 VISIT TO DETERM LDCT ELIG: HCPCS | Performed by: INTERNAL MEDICINE

## 2025-07-23 ENCOUNTER — HOSPITAL ENCOUNTER (OUTPATIENT)
Dept: CT IMAGING | Age: 65
Discharge: HOME | End: 2025-07-23
Attending: INTERNAL MEDICINE
Payer: MEDICARE

## 2025-07-23 DIAGNOSIS — Z87.891 PERSONAL HISTORY OF TOBACCO USE: ICD-10-CM

## 2025-07-23 PROCEDURE — 71271 CT THORAX LUNG CANCER SCR C-: CPT

## 2025-08-06 ENCOUNTER — OFFICE VISIT (OUTPATIENT)
Dept: PULMONOLOGY | Age: 65
End: 2025-08-06
Payer: MEDICARE

## 2025-08-06 VITALS
TEMPERATURE: 97.6 F | WEIGHT: 208.6 LBS | HEIGHT: 64 IN | OXYGEN SATURATION: 95 % | BODY MASS INDEX: 35.61 KG/M2 | HEART RATE: 18 BPM | SYSTOLIC BLOOD PRESSURE: 128 MMHG | RESPIRATION RATE: 18 BRPM | DIASTOLIC BLOOD PRESSURE: 86 MMHG

## 2025-08-06 DIAGNOSIS — E66.9 OBESITY (BMI 30-39.9): ICD-10-CM

## 2025-08-06 DIAGNOSIS — G47.33 OSA (OBSTRUCTIVE SLEEP APNEA): Primary | ICD-10-CM

## 2025-08-06 DIAGNOSIS — J44.9 COPD, MODERATE (HCC): ICD-10-CM

## 2025-08-06 PROCEDURE — 1036F TOBACCO NON-USER: CPT | Performed by: INTERNAL MEDICINE

## 2025-08-06 PROCEDURE — G8427 DOCREV CUR MEDS BY ELIG CLIN: HCPCS | Performed by: INTERNAL MEDICINE

## 2025-08-06 PROCEDURE — G8400 PT W/DXA NO RESULTS DOC: HCPCS | Performed by: INTERNAL MEDICINE

## 2025-08-06 PROCEDURE — 3023F SPIROM DOC REV: CPT | Performed by: INTERNAL MEDICINE

## 2025-08-06 PROCEDURE — G8417 CALC BMI ABV UP PARAM F/U: HCPCS | Performed by: INTERNAL MEDICINE

## 2025-08-06 PROCEDURE — 99214 OFFICE O/P EST MOD 30 MIN: CPT | Performed by: INTERNAL MEDICINE

## 2025-08-06 PROCEDURE — 3017F COLORECTAL CA SCREEN DOC REV: CPT | Performed by: INTERNAL MEDICINE

## 2025-08-06 PROCEDURE — 1090F PRES/ABSN URINE INCON ASSESS: CPT | Performed by: INTERNAL MEDICINE

## 2025-08-06 PROCEDURE — 1123F ACP DISCUSS/DSCN MKR DOCD: CPT | Performed by: INTERNAL MEDICINE
